# Patient Record
Sex: FEMALE | Race: WHITE | NOT HISPANIC OR LATINO | URBAN - METROPOLITAN AREA
[De-identification: names, ages, dates, MRNs, and addresses within clinical notes are randomized per-mention and may not be internally consistent; named-entity substitution may affect disease eponyms.]

---
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
## 2023-08-01 ENCOUNTER — LABORATORY RESULT (OUTPATIENT)
Age: 18
End: 2023-08-01

## 2023-08-01 ENCOUNTER — APPOINTMENT (OUTPATIENT)
Dept: TRANSGENDER CARE | Facility: CLINIC | Age: 18
End: 2023-08-01
Payer: MEDICAID

## 2023-08-01 VITALS
DIASTOLIC BLOOD PRESSURE: 70 MMHG | SYSTOLIC BLOOD PRESSURE: 100 MMHG | OXYGEN SATURATION: 97 % | HEART RATE: 77 BPM | BODY MASS INDEX: 19.49 KG/M2 | HEIGHT: 65 IN | RESPIRATION RATE: 16 BRPM | WEIGHT: 117 LBS | TEMPERATURE: 97.7 F

## 2023-08-01 PROCEDURE — 99214 OFFICE O/P EST MOD 30 MIN: CPT

## 2023-08-01 NOTE — DATA REVIEWED
[FreeTextEntry1] : Labs 10/2022: Lipid Profile at goal CMP normal Estradiol 102 Testosterone 3.5 A1c 5%  Labs 6/2022: CMP normal Estradiol 102 Testosterone <2.5 LH 0.9 FSH <0.3   Labs 8/2021: Estradiol 56 Testosterone 144 SHBG 40 Lipid Profile at goal CMP normal  Labs 1/2021: Estradiol 6 Testosterone 472 Hb 15.2 HIV nonreactive A1c 4.9%  Chol 132 HDL 46 LDL 63 CMP normal Prolactin 11.6 Vit D 15.6

## 2023-08-01 NOTE — HISTORY OF PRESENT ILLNESS
[FreeTextEntry1] : Sasha KITCHEN is a 17 year old, transfemale here for follow-up for gender affirming hormone therapy  Specific Terms for Body Parts: medical terms are good Call pt: Sasha - AGAB: Male - Gender Identity: FEMALE - Pronouns: SHE/HER - Sexual Orientation: Lesbian - Spoke with: patient - Name Change + Gender Marker: Patient is interested in name change and gender marker resources. Has been avoiding due to lack of time. - Dr. Negrito Gallegos, Psychiatry for eval for readiness for hormone affirming therapy   Seen initially by me 5/2021. Started on estradiol 1mg BID and spironolactone 100mg BID. Now on estradiol 6mg daily and spironolactone 50mg BID with increase in estradiol 8/2021 and again 11/2022 and decreased spironolactone 6/2022 and 11/2022 for suppressed T level. Interested in feminization.  Patient experiences gender dysphoria when he notices another female and her expressions often comparing herself to cisgender females. She also has discomfort when looking at her chest area, bottom area, facial hair, and hears her deep voice. Hates her keysha's apple. Interested in bottom surgery in a few years. Not interested in top surgery unless hormones do not provide enough breast growth. Interested in tracheal shave. Not interested in sperm banking Tucks via a sock and underwear band. No complications. No hx of thromboembolic disease or liver disorders. No tobacco use. +Marijuana use No headaches, changes in vision, vomiting, palpitations, chest pain, LE swelling or calf pain, SOB. +nausea in the morning.  +increased depression and anxiety. No suicidal thoughts or plans.Seeing therapist and psychiatrist    - Transition HX + Present Life: Patient states that she has always wished that she was a woman and thought that this desire was normal. As a child, she played with toys conventionally designed for young girls. Patient learned that this was a taboo subject from her family and did not reveal this desire to anyone as an adolescent, because she was fearful that she would be called a pervert or be perceived as disgusting. She suppressed these desires to present as feminine up until she started high school. She was bullied relentlessly before high school and more after that. For example, her cousin painted her nails and family members teased her over her decision.   Eventually, in her freshman year she met a trans individual, who exposed her to the transgender culture. Patient could easily identify with the general transfemale experience and grew to care less about her family's opinion of her presentation and desires. Patient eventually came out to her mom over the phone. Her reaction was not positive, but the patient accepted it. During a short course of time, the entire family became aware of this. Her aunt (patient's legal guardian), two cousins, and grandma tried to ignore her gender identity, however she insisted that they start acknowledging her identity by using her preferred name and pronouns.   - Mental Health + Medications: Patient has anxiety, depression, and self-harms. Self harm- cut thighs. No history of depression or suicidal ideation. Patient receives counseling from mental health therapist Darlene who practices in [place], [place] (Phone number: [phone]). They have met once a week since late 2019/early 2020, before the COVID-19 quarantine. Patient also started seeing Psychiatrist Dr. Justus Dailey (Phone #: [phone]) after she discontinued seeing Dr. Gallegos after his transfer from [facility]. She sees him once every 2 months.

## 2023-08-01 NOTE — PHYSICAL EXAM
[Alert] : alert [No Acute Distress] : no acute distress [No Proptosis] : no proptosis [Normal Hearing] : hearing was normal [Supple] : the neck was supple [Thyroid Not Enlarged] : the thyroid was not enlarged [No Respiratory Distress] : no respiratory distress [No Accessory Muscle Use] : no accessory muscle use [Normal Rate and Effort] : normal respiratory rate and effort [Clear to Auscultation] : lungs were clear to auscultation bilaterally [Normal S1, S2] : normal S1 and S2 [Normal Rate] : heart rate was normal [Regular Rhythm] : with a regular rhythm [No Edema] : no peripheral edema [No Stigmata of Cushings Syndrome] : no stigmata of Cushings Syndrome [No Clubbing, Cyanosis] : no clubbing  or cyanosis of the fingernails [No Involuntary Movements] : no involuntary movements were seen [Normal Strength/Tone] : muscle strength and tone were normal [Oriented x3] : oriented to person, place, and time [Normal Affect] : the affect was normal [Normal Mood] : the mood was normal [Normal Bowel Sounds] : normal bowel sounds [Not Tender] : non-tender [Not Distended] : not distended [Soft] : abdomen soft [Kyphosis] : no kyphosis present [de-identified] : thin

## 2023-08-01 NOTE — REVIEW OF SYSTEMS
[Nausea] : nausea [All other systems negative] : All other systems negative [Fatigue] : no fatigue [Recent Weight Gain (___ Lbs)] : no recent weight gain [Recent Weight Loss (___ Lbs)] : no recent weight loss [Visual Field Defect] : no visual field defect [Dysphagia] : no dysphagia [Chest Pain] : no chest pain [Palpitations] : no palpitations [Shortness Of Breath] : no shortness of breath [Vomiting] : no vomiting [Polyuria] : no polyuria [Joint Pain] : no joint pain [Acanthosis] : no acanthosis  [Headaches] : no headaches [Tremors] : no tremors [Depression] : no depression [Polydipsia] : no polydipsia [Cold Intolerance] : no cold intolerance [Heat Intolerance] : no heat intolerance [FreeTextEntry7] : +morning nausea [FreeTextEntry9] : +right ankle pain s/p injury

## 2023-08-01 NOTE — ASSESSMENT
[FreeTextEntry1] : 17 y/o transfemale here for feminizing hormone affirming transition. Discussed the feminization process involving estrogen with spironolactone. Discussed physical changes such as breast growth, softer skin, body fat redistribution, less erections etc with timeline ranging from 3-6 months initial onset to maximal benefits in 1-2 years. Pt. seems to prefer more feminine appearance therefore will optimize our goal of testosterone suppression and estrogen levels. Discussed potential risks of transaminitis, CAD and thromboembolic disease especially with tobacco use. Will need to monitor BP, LFTs, and potassium on hormones.No plans for top or bottom surgery at this time. Psych and mental health follow-up recommended. Will need age-appropriate health care and cancer screening maintenance over the years with testicular exam, mammogram or US, colonoscopy, prostate exam. Pt. not interested in sperm banking. Started estradiol 1mg BID and spironolactone 100mg BID with increased doses now on estradiol 6mg daily and spironolactone 100 daily. Check levels on current doses.   Discussion with patient regarding hormone regimen with management plan, risks and benefits, treatment options and goals of care answering all patients questions and addressing all concerns Post-visit completion, consultation, charting and review  Total Time 30 min  Specifically causes, evaluation, treatment options, risks, complications, and benefits of available therapies were discussed. Questions were answered.  The submitted E/M billing level for this visit reflects the total time spent on the day of the visit including face-to-face time spent with the patient, non-face-to-face review of medical records and relevant information, documentation, and asynchronous communication with the patient after a visit via phone, email, or patient's EHR portal after the visit.  The medical records reviewed are either scanned into the chart or reviewed with the patient using a patient's electronic medical records portal for patients with records not available to [facility] via electronic transmission platforms from other institutions and labs.  Time spend counseling and performing coordination of care was also included in determining the appropriate EM billing level.  I have reviewed and verified information regarding the chief complaint and history recorded by the ancillary staff and/or the patient. I have independently reviewed and interpreted tests performed by other physicians and facilities as necessary.   I have discussed with the patient differential diagnosis, reason for auxiliary tests if ordered, risks, benefits, alternatives, and complications of each form of therapy were discussed.

## 2023-10-04 ENCOUNTER — APPOINTMENT (OUTPATIENT)
Dept: PLASTIC SURGERY | Facility: CLINIC | Age: 18
End: 2023-10-04
Payer: MEDICAID

## 2023-10-04 VITALS
WEIGHT: 126 LBS | BODY MASS INDEX: 20.99 KG/M2 | DIASTOLIC BLOOD PRESSURE: 82 MMHG | HEART RATE: 85 BPM | HEIGHT: 65 IN | SYSTOLIC BLOOD PRESSURE: 118 MMHG | TEMPERATURE: 97.7 F

## 2023-10-04 PROCEDURE — 99204 OFFICE O/P NEW MOD 45 MIN: CPT

## 2023-10-05 RX ORDER — QUETIAPINE 400 MG/1
400 TABLET, FILM COATED ORAL
Refills: 0 | Status: ACTIVE | COMMUNITY

## 2023-10-24 RX ORDER — ESTRADIOL 2 MG/1
2 TABLET ORAL
Qty: 270 | Refills: 3 | Status: ACTIVE | COMMUNITY
Start: 2021-05-27 | End: 1900-01-01

## 2023-11-30 RX ORDER — SPIRONOLACTONE 50 MG/1
50 TABLET ORAL
Qty: 180 | Refills: 3 | Status: ACTIVE | COMMUNITY
Start: 2021-05-27 | End: 1900-01-01

## 2024-02-26 ENCOUNTER — APPOINTMENT (OUTPATIENT)
Dept: PLASTIC SURGERY | Facility: CLINIC | Age: 19
End: 2024-02-26
Payer: MEDICAID

## 2024-02-26 VITALS
TEMPERATURE: 98 F | DIASTOLIC BLOOD PRESSURE: 73 MMHG | HEIGHT: 65 IN | SYSTOLIC BLOOD PRESSURE: 108 MMHG | HEART RATE: 75 BPM | BODY MASS INDEX: 19.99 KG/M2 | WEIGHT: 120 LBS

## 2024-02-26 PROCEDURE — 99203 OFFICE O/P NEW LOW 30 MIN: CPT

## 2024-03-03 NOTE — DISCUSSION/SUMMARY
[FreeTextEntry1] : This michelle patient began transitioning in ~2017. She has been on hormonal therapy consistently since 2021 when patient turned 16 years old. She has been in therapy and was diagnosed with Gender Dysphoria concerned about certain facial features that appear masculine and cause bullying desires facial feminization surgery followed by Transgender team. The following facial features appear masculine and will need to be modified: - Luis's apple   Allergies: - Some fruit cause itchy throat    Meds: - Estradiol  - Testosterone - Lexipro - Lamictal - Seroquel  PMHx: - Bipolar Disorder type 2 - ADHD   Surgical History Surgery Type: Elbow fix after a break Year: "chyeanne" Complications?: No  Denies previous botox, fillers or silicone injections.   SH: Admits to marijuana use daily, Denies Cigarette use   ROS: General health / Constitutional: no fever, no chills, no unusual weight changes, no energy level changes, no night sweats Skin: No color or pigmentation changes, no pruritis, no rashes, no ulcers, Hair: No changes in color, texture, distribution, loss Nails: No color changes, brittleness, infection Head: No headaches, no new jaw pain Eyes: Good visual acuity, no color blindness, no corrective lenses, no photophobia, no diplopia, no blurred vision, no infection, pain, no medications, Ear: no tinnitus, no discharge, no pain, no medications Nose: no epistaxis, no rhinorrhea, no rhinitis, no pain, Mouth & Throat: no gingivitis, no gingival bleeding, no ulcers, no voice changes, no changes in oral mucosa or tongue Neck no stiffness, no pain, no lymphadenitis, no thyroid disorders, Respiratory: no cough, no dyspnea, no wheezing, no chest pain, cyanosis, no pneumonia, no asthma, Cardiovascular: no chest pain, no palpitations, no irregular rhythm, no tachycardia, no bradycardia, no heart failure, no dyspnea on exertion (LEVY), no edema Gastrointestinal: no nausea / vomiting, no dysphagia, no reflux / GERD, no abdominal pain, no jaundice Musculoskeletal: no pain in muscles, bones, or joints; no fractures, no dislocations, no muscular weakness, no atrophy Neurological: no paresis, no paralysis, no paresthesia, no seizures, no dizziness, no syncope, no ataxia, no tremor Psychological: no childhood behavioral problems, no irritability, no sleep changes Hematological: no anemia, no bruising, no bleeding tendencies,   PHYSICAL EXAM General: WDWN, in no distress, A & O x 3 (person, place, time) HEENT Head: AT/NC (atraumatic, normocephalic), including TMJ, sensory and motor; + slightly prominent brow and lateral orbital rim type III Eyes: EOMI, PERRLA Ears: exterior, nl hearing, Nose: + slightly wide, slightly bulbous nasal tip with acute nasolabial angle Throat & mouth: gd palate elevation, nl tongue mobility, nl tonsil size; + slightly prominent mandibular angle with active masseter, slightly boxy wide chin Neck: no masses, nl pulses, + prominent tracheal bulge ("Luis's Apple")   We had a 25 minute meeting with the patient discussing diagnosis and medical management issues and outcomes.   First stage FFS: 62110: Tracheal shave, Laryngeothyroidoplasty 41656: Submental fat excision 54791: platysmaplasty         74357 (Submental fat removal): Testosterone exposure will build fibrofatty tissue in the submental region that is not corrected by hormone therapy. This patient has this fibrofatty tissue in the submental region which has created a masculine lateral profile appearance. Direct submental fibrofatty tissue excision is necessary to correct this male feature.         32243 (Platysmaplasty): With prolonged testosterone exposure, the submental region will appear full and ptotic. This patient has a full and ptotic submental and neck region which is associated with a male-appearing neck. The female neck is slender and tight. The platsymaplasty, performed after submental fat excision, will help create a slender and tight, female appearing neck.          81094 (Tracheal shave or tracheolaryngoplasty): A prominent Avila Apple is a feature associated with males. Not all trans-women have a prominent Avila Apple. 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 This is seen on lateral head position and when her head is raised to the paulette. It causes her intense feelings of dysphoria and it is a cause for misgendering. Therefore, the patient would benefit from a tracheal shave procedure which eliminates this prominent Avila Apple associated with male appearance.   No CT scan needed. She will need to provide a letter from her therapist and hormone provider. Will need PCP clearance.   Patient seen in conjunction with Dr. Pete Jensen.

## 2024-03-06 ENCOUNTER — APPOINTMENT (OUTPATIENT)
Dept: TRANSGENDER CARE | Facility: CLINIC | Age: 19
End: 2024-03-06
Payer: MEDICAID

## 2024-03-06 VITALS
HEART RATE: 89 BPM | HEIGHT: 65 IN | WEIGHT: 123 LBS | DIASTOLIC BLOOD PRESSURE: 64 MMHG | SYSTOLIC BLOOD PRESSURE: 120 MMHG | BODY MASS INDEX: 20.49 KG/M2 | TEMPERATURE: 98.1 F | OXYGEN SATURATION: 97 %

## 2024-03-06 DIAGNOSIS — F64.0 TRANSSEXUALISM: ICD-10-CM

## 2024-03-06 DIAGNOSIS — F31.9 BIPOLAR DISORDER, UNSPECIFIED: ICD-10-CM

## 2024-03-06 PROCEDURE — 99214 OFFICE O/P EST MOD 30 MIN: CPT

## 2024-03-06 NOTE — HISTORY OF PRESENT ILLNESS
[FreeTextEntry1] : establish primary care [de-identified] : Patient here today for letter, getting tracheal shave done (first stage FFS) with Dr. Jensen. She has therapist/psychiatrist she is following to get MH letter. She denies any thoughts of hurting self or anyone else, stable on medication. She is following with Dr. Avila for gaht, feeling good on current regimen. She is going to schedule f/u with him, declines any labs or STI testing today.  SH: patient working at [employer] and studying psychology at [employer], enjoys  Pt denies any SOB, cough, chest pain, palpitations, N/V/D/C, fever, or chills. No other health concerns today.

## 2024-03-06 NOTE — HEALTH RISK ASSESSMENT
[HIV test declined] : HIV test declined [Hepatitis C test declined] : Hepatitis C test declined [PHQ-2 Positive] : PHQ-2 Positive [PHQ-9 Positive] : PHQ-9 Positive [I have developed a follow-up plan documented below in the note.] : I have developed a follow-up plan documented below in the note.

## 2024-03-06 NOTE — PLAN
[FreeTextEntry1] : Bipolar Disorder: stable, patient denies any thoughts of hurting self or anyone else at time of visit. Continue care under therapist/psych. Advised patient to call with any questions or concerns. Patient verbalized understanding.   Gender dysphoria: letter written and discussed with patient at time of visit, see letter with today's date on chart. well controlled, continue care under specialist. Advised patient to call with any questions or concerns. Patient verbalized understanding.

## 2024-07-29 ENCOUNTER — APPOINTMENT (OUTPATIENT)
Dept: PLASTIC SURGERY | Facility: CLINIC | Age: 19
End: 2024-07-29

## 2024-07-29 PROCEDURE — 99214 OFFICE O/P EST MOD 30 MIN: CPT

## 2024-08-07 ENCOUNTER — TRANSCRIPTION ENCOUNTER (OUTPATIENT)
Age: 19
End: 2024-08-07

## 2024-08-08 ENCOUNTER — TRANSCRIPTION ENCOUNTER (OUTPATIENT)
Age: 19
End: 2024-08-08

## 2024-08-08 ENCOUNTER — APPOINTMENT (OUTPATIENT)
Dept: PLASTIC SURGERY | Facility: HOSPITAL | Age: 19
End: 2024-08-08

## 2024-08-08 PROCEDURE — 15825 RHYTDCT NCK PLTYSML TGHTG: CPT

## 2024-08-08 PROCEDURE — 15839 EXC EXCESSIVE SKN OTHER AREA: CPT

## 2024-08-08 PROCEDURE — 31899 UNLISTED PX TRACHEA BRONCHI: CPT

## 2024-08-11 NOTE — DISCUSSION/SUMMARY
[FreeTextEntry1] : Patient presented for preoperative consultation prior to facial feminization surgery We reviewed the virtual planning with the patient She desires to have the following facial features modified: -Neck tracheal shave We reviewed these procedures and their risks   FH: noncontributory   SH: no secondary tobacco use,   ROS: General health / Constitutional      no fever, no chills, no unusual weight changes, no energy level changes, no night sweats Skin       No color or pigmentation changes, no pruritis, no rashes, no ulcers, Hair       No changes in color, texture,  distribution, loss Nails       No color changes, brittleness, infection Head       No headaches, no new jaw pain Eyes       Good visual acuity, no color blindness, no corrective lenses, no photophobia, no diplopia, no blurred vision, no infection, pain, no medications, Ear      no tinnitus, no discharge, no pain, no medications Nose      no epistaxis, no rhinorrhea, no rhinitis, no pain, Mouth & Throat      no gingivitis, no gingival bleeding, no ulcers, no voice changes, no changes in oral mucosa or tongue Neck      no stiffness, no pain, no lymphadenitis, no thyroid disorders, Respiratory      no cough, no dyspnea, no wheezing,  no chest pain, cyanosis, no pneumonia, no asthma, Cardiovascular      no chest pain, no palpitations, no irregular rhythm, no tachycardia, no bradycardia, no heart failure, no dyspnea on exertion (LEVY), no edema Gastrointestinal      no nausea / vomiting, no dysphagia, no reflux / GERD, no abdominal pain, no jaundice Musculoskeletal      no pain in muscles, bones, or joints; no fractures, no dislocations, no muscular weakness, no atrophy Neurological      no paresis, no paralysis, no paresthesia, no seizures, no dizziness, no syncope, no ataxia, no tremor Psychological      no childhood behavioral problems, no irritability, no sleep changes Hematological      no anemia, no bruising, no bleeding tendencies,   PHYSICAL EXAM General WDWN, in no distress,  A & O x 3 (person, place, time) HEENT Head: AT/NC (atraumatic, normocephalic), including TMJ, sensory and motor; Prominent brow and lateral orbital rim type III Eyes: EOMI, PERRLA Ears: exterior, nl hearing, Nose: slightly wide, bulbous nasal tip with acute nasiolabial angle intranasal exam showed enlarged turbinates and deviated caudal septum Throat & mouth: gd palate elevation, nl tongue mobility, nl tonsil size Prominent mandibular angle with active masseter Wide chin   Neck: no masses, nl pulses +excess submental fat and prominent laryngeal prominence   We had a 35 minute meeting with the patient discussing diagnosis and medical management issues and outcomes.  FFS: -neck tracheal shave   Spend 35 minutes with patient discussing the diagnosis and treatment plan. Patient informed of the timing and risks moving forward. All patient questions were answered. Patient was given written instructions for care and follow up visit. We discussed the instructions and the patient confirmed an understanding of them.   Bleeding- It is possible, though unusual, to experience a bleeding episode during or after surgery. Should post-operative bleeding occur, it may require emergency treatment to drain accumulated blood or blood transfusion. Intra-operative blood transfusion may also be required. Do not take any aspirin or anti-inflammatory medications for ten days before or after surgery, as this may increase the risk of bleeding. Non-prescription herbs and dietary supplements can increase the risk of surgical bleeding. Hematoma can occur at any time following injury to the breast. If blood transfusions are necessary to treat blood loss, there is the risk of blood-related infections such as hepatitis and HIV (AIDS). Heparin medications that are used to prevent blood clots in veins can produce bleeding and decreased blood platelets.   Infection- Infection is unusual after surgery. Should an infection occur, additional treatment including antibiotics, hospitalization, or additional surgery may be necessary.   Change Skin Sensation- You may experience a diminished (or loss) of sensitivity of the skin of operative area. Partial or permanent loss of skin sensation can occur after surgery. Skin Contour Irregularities- Contour and shape irregularities may occur after surgery. Visible and palpable wrinkling may occur. Residual skin irregularities at the ends of the incisions or dog ears are always a possibility when there is excessive redundant skin. This may improve with time, or it can be surgically corrected.   Sutures- Most surgical techniques use deep sutures. You may notice these sutures after your surgery. Sutures may spontaneously poke through the skin, become visible or produce irritation that requires suture removal.   Skin Discoloration / Swelling- Some bruising and swelling normally occurs following surgery. The skin in or near the surgical site can appear either lighter or darker than surrounding skin. Although uncommon, swelling and skin discoloration may persist for long periods of time and, in rare situations, may be permanent.   Skin Sensitivity- Itching, tenderness, or exaggerated responses to hot or cold temperatures may occur after surgery. Usually this resolves during healing, but in rare situations it may be chronic.   Scarring- All surgery leaves scars, some more visible than others. Although good wound healing after a surgical procedure is expected, abnormal scars may occur within the skin and deeper tissues. Scars may be unattractive and of different color than the surrounding skin tone. Scar appearance may also vary within the same scar. Scars may be asymmetrical (appear different on the right and left side of the body). There is the possibility of visible marks in the skin from sutures. In some cases scars may require surgical revision or treatment.   Damage to Deeper Structures- There is the potential for injury to deeper structures including, nerves, blood vessels, muscles during any surgical procedure. The potential for this to occur varies according to the type of procedure being performed. Injury to deeper structures may be temporary or permanent.   Delayed Healing- Wound disruption or delayed wound healing is possible. Some areas of the skin may not heal normally and may take a long time to heal. Areas of skin tissue may die. This may require frequent dressing changes or further surgery to remove the non-healed tissue. Individuals who have decreased blood supply to tissue from past surgery or radiation therapy may be at increased risk for wound healing and poor surgical outcome. Smokers have a greater risk of skin loss and wound healing complications.   Fat Necrosis- Fatty tissue found deep in the skin might die. This may produce areas of firmness within the skin. Additional surgery to remove areas of fat necrosis may be necessary. There is the possibility of contour irregularities in the skin that may result from fat necrosis.   Allergic Reactions- In rare cases, local allergies to tape, suture material and glues, blood products, topical preparations or injected agents have been reported. Serious systemic reactions including shock (anaphylaxis) may occur in response to drugs used during surgery and prescription medicines. Allergic reactions may require additional treatment.

## 2024-08-19 ENCOUNTER — APPOINTMENT (OUTPATIENT)
Dept: PLASTIC SURGERY | Facility: CLINIC | Age: 19
End: 2024-08-19

## 2024-08-19 DIAGNOSIS — Z09 ENCOUNTER FOR FOLLOW-UP EXAMINATION AFTER COMPLETED TREATMENT FOR CONDITIONS OTHER THAN MALIGNANT NEOPLASM: ICD-10-CM

## 2024-08-19 PROCEDURE — 99024 POSTOP FOLLOW-UP VISIT: CPT

## 2024-08-19 NOTE — PHYSICAL EXAM
[de-identified] : Alert, calm, cooperative. [de-identified] : Tracheal shave incision is well approximated with no signs of wound dehiscence or fluctuance. Moderate edema and mild ecchymosis noted. [de-identified] : Respirations even and unlabored.

## 2024-08-19 NOTE — DISCUSSION/SUMMARY
[FreeTextEntry1] : Sasha is a 19 year old transgender female patient that presents to the office today for a post operative evaluation s/p Submental fat excision, Platysmaplasty, Tracheal shave on 8/8/2024. Patient is healing well. Instructions reviewed and questions/concerns answered.  - Sleep with at least 2 pillows to keep head elevated. - Wear jaw bra 24/7 for at least one more week and then you may switch to wearing jaw bra at night time only.  - Use Ibuprofen for pain relief. - You may shower and air dry or gently pat dry. - Avoid sun exposure to incision sites. - You may advance diet as tolerated.  - Contact us for any questions or concerns. - Follow up on Friday for AI morphing for next FFS stage.   Patient seen in conjunction with Dr. Jensen.

## 2024-08-19 NOTE — REASON FOR VISIT
[Post Op: _________] : a [unfilled] post op visit [FreeTextEntry1] : DATE OF OPERATION: 08/08/2024 PROCEDURES: - Submental Fat Excision - Platysmaplasty - Tracheal Shave

## 2024-08-19 NOTE — HISTORY OF PRESENT ILLNESS
[FreeTextEntry1] : Sasha is a 19 year old transgender female patient that presents to the office today for a post operative evaluation s/p Submental fat excision, Platysmaplasty, Tracheal shave on 8/8/2024. Patient denies having any significant concerns or complaints.

## 2024-08-23 ENCOUNTER — APPOINTMENT (OUTPATIENT)
Dept: PLASTIC SURGERY | Facility: CLINIC | Age: 19
End: 2024-08-23
Payer: MEDICAID

## 2024-08-23 VITALS
DIASTOLIC BLOOD PRESSURE: 68 MMHG | OXYGEN SATURATION: 97 % | HEART RATE: 87 BPM | BODY MASS INDEX: 19.99 KG/M2 | HEIGHT: 65 IN | TEMPERATURE: 98.6 F | SYSTOLIC BLOOD PRESSURE: 111 MMHG | WEIGHT: 120 LBS

## 2024-08-23 DIAGNOSIS — F64.0 TRANSSEXUALISM: ICD-10-CM

## 2024-08-23 PROCEDURE — 99024 POSTOP FOLLOW-UP VISIT: CPT

## 2024-08-25 NOTE — ASSESSMENT
[FreeTextEntry1] : Sasha is a 19 year old transgender female patient that presents to the office today for a post operative evaluation s/p Submental fat excision, Platysmaplasty, Tracheal shave on 8/8/2024. Patient is healing well. Instructions reviewed and questions/concerns answered. AI morphing was performed with the patient to explore her desire for further FFS surgery.  - Sleep with at least 2 pillows to keep head elevated. - Wear jaw bra 24/7 for at least one more week and then you may switch to wearing jaw bra at night time only. - Use Ibuprofen for pain relief. - You may shower and air dry or gently pat dry. - Avoid sun exposure to incision sites. - You may advance diet as tolerated. - Contact us for any questions or concerns.

## 2024-08-25 NOTE — HISTORY OF PRESENT ILLNESS
[FreeTextEntry1] : VALARIE KITCHEN is a 19 year old transgender female patient that presents to the office today for a post operative evaluation s/p Submental fat excision, Platysmaplasty, Tracheal shave on 8/8/2024. Patient denies having any significant concerns or complaints.

## 2024-09-18 ENCOUNTER — APPOINTMENT (OUTPATIENT)
Dept: TRANSGENDER CARE | Facility: CLINIC | Age: 19
End: 2024-09-18
Payer: MEDICAID

## 2024-09-18 VITALS
WEIGHT: 126 LBS | OXYGEN SATURATION: 98 % | DIASTOLIC BLOOD PRESSURE: 68 MMHG | HEART RATE: 74 BPM | HEIGHT: 65 IN | SYSTOLIC BLOOD PRESSURE: 120 MMHG | TEMPERATURE: 98 F | BODY MASS INDEX: 20.99 KG/M2

## 2024-09-18 DIAGNOSIS — Z13.1 ENCOUNTER FOR SCREENING FOR DIABETES MELLITUS: ICD-10-CM

## 2024-09-18 DIAGNOSIS — Z13.220 ENCOUNTER FOR SCREENING FOR LIPOID DISORDERS: ICD-10-CM

## 2024-09-18 DIAGNOSIS — F64.0 TRANSSEXUALISM: ICD-10-CM

## 2024-09-18 PROCEDURE — 99215 OFFICE O/P EST HI 40 MIN: CPT

## 2024-09-18 RX ORDER — PROGESTERONE 100 MG/1
100 CAPSULE ORAL
Qty: 90 | Refills: 1 | Status: ACTIVE | COMMUNITY
Start: 2024-09-18 | End: 1900-01-01

## 2024-09-18 NOTE — HISTORY OF PRESENT ILLNESS
[FreeTextEntry1] : Sasha KITCHEN is a 19 year old, transfemale here for follow-up for gender affirming hormone therapy Pt of Dr. Avila  Specific Terms for Body Parts: medical terms are good Call pt: Sasha - AGAB: Male - Gender Identity: FEMALE - Pronouns: SHE/HER - Sexual Orientation: Lesbian - Spoke with: patient - Name Change + Gender Marker: Patient is interested in name change and gender marker resources. Has been avoiding due to lack of time. - Dr. Negrito Gallegos, Psychiatry for eval for readiness for hormone affirming therapy  Started [facility] in 2021 at age 16. Has been on steady dosing for 1-2 years.  current regimen: PO estradiol 6 mg daily +  spironolactone 50 mg bid interested in progesterone   no breast cancer in family  Seen initially by Dr. Avila 5/2021. Started on estradiol 1mg BID and spironolactone 100mg BID. Now on estradiol 6mg daily and spironolactone 50mg BID with increase in estradiol 8/2021 and again 11/2022 and decreased spironolactone 6/2022 and 11/2022 for suppressed T level. Interested in feminization.  Patient experiences gender dysphoria when he notices another female and her expressions often comparing herself to cisgender females. She also has discomfort when looking at her chest area, bottom area, facial hair, and hears her deep voice.  Interested in bottom surgery in a few years. Not interested in top surgery unless hormones do not provide enough breast growth. Not interested in sperm banking Tucks via a sock and underwear band. No complications. No hx of thromboembolic disease or liver disorders. No tobacco use. +Marijuana use No headaches, changes in vision, vomiting, palpitations, chest pain, LE swelling or calf pain, SOB. +nausea in the morning.  Seeing therapist and psychiatrist   s/p Submental fat excision, Platysmaplasty, Tracheal shave on 8/8/2024 with Dr. Jensen  - Transition HX + Present Life: Patient states that she has always wished that she was a woman and thought that this desire was normal. As a child, she played with toys conventionally designed for young girls. Patient learned that this was a taboo subject from her family and did not reveal this desire to anyone as an adolescent, because she was fearful that she would be called a pervert or be perceived as disgusting. She suppressed these desires to present as feminine up until she started high school. She was bullied relentlessly before high school and more after that. For example, her cousin painted her nails and family members teased her over her decision.  Eventually, in her freshman year she met a trans individual, who exposed her to the transgender culture. Patient could easily identify with the general transfemale experience and grew to care less about her family's opinion of her presentation and desires. Patient eventually came out to her mom over the phone. Her reaction was not positive, but the patient accepted it. During a short course of time, the entire family became aware of this. Her aunt (patient's legal guardian), two cousins, and grandma tried to ignore her gender identity, however she insisted that they start acknowledging her identity by using her preferred name and pronouns.   - Mental Health + Medications: Patient has anxiety, depression, and self-harms. Self harm- cut thighs. No history of depression or suicidal ideation. Patient receives counseling from mental health therapist Darlene, who practices in [place], [place] (Phone number: [phone]). They have met once a week since late 2019/early 2020, before the COVID-19 quarantine. Patient also started seeing Psychiatrist Dr. Justus Dailey (Phone #: [phone]) after she discontinued seeing Dr. Gallegos after his transfer from [facility]. She sees him once every 2 months.  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

## 2024-09-18 NOTE — ASSESSMENT
[FreeTextEntry1] : 18 y/o transfemale here for feminizing hormone affirming transition.  We have previously discussed the feminization process involving estrogen with spironolactone. Discussed physical changes such as breast growth, softer skin, body fat redistribution, less erections etc with timeline ranging from 3-6 months initial onset to maximal benefits in 1-2 years. Pt. seems to prefer more feminine appearance therefore will optimize our goal of testosterone suppression and estrogen levels. We have discussed potential risks of transaminitis, CAD and thromboembolic disease especially with tobacco use. Will need to monitor BP, LFTs, and potassium on hormones. Psych and mental health follow-up recommended. Will need age-appropriate health care and cancer screening maintenance over the years with testicular exam, mammogram or US, colonoscopy, prostate exam. Pt. not interested in sperm banking. Started estradiol 1mg BID and spironolactone 100mg BID with increased doses now on estradiol 6mg daily and spironolactone 100 daily. Check levels on current doses.  - pt is interested in starting progesterone now. Discussed in detail risks and benefits. If no benefit at 6 month armando pt will stop. - discussed that progestins are not typically recommended due to increased CV, VTE, and breast cancer risk based on WHI data in postmenopausal cis-gendered women but there is limited data for transgender populations and there is an unclear risk in this population - still potential risk long term of CAD, breast cancer, VTE; there is anecdotal evidence of improved breast growth/libido. Understands the theoretical risks of progestins and how data is overall limited.   RTC 3-4 months

## 2024-09-18 NOTE — PHYSICAL EXAM
[Alert] : alert [No Acute Distress] : no acute distress [No Proptosis] : no proptosis [Normal Hearing] : hearing was normal [Supple] : the neck was supple [Thyroid Not Enlarged] : the thyroid was not enlarged [No Respiratory Distress] : no respiratory distress [No Accessory Muscle Use] : no accessory muscle use [Normal Rate and Effort] : normal respiratory rate and effort [Clear to Auscultation] : lungs were clear to auscultation bilaterally [Normal S1, S2] : normal S1 and S2 [Normal Rate] : heart rate was normal [Regular Rhythm] : with a regular rhythm [No Edema] : no peripheral edema [Normal Bowel Sounds] : normal bowel sounds [Not Tender] : non-tender [Not Distended] : not distended [Soft] : abdomen soft [No Stigmata of Cushings Syndrome] : no stigmata of Cushings Syndrome [No Clubbing, Cyanosis] : no clubbing  or cyanosis of the fingernails [No Involuntary Movements] : no involuntary movements were seen [Normal Strength/Tone] : muscle strength and tone were normal [Oriented x3] : oriented to person, place, and time [Normal Affect] : the affect was normal [Normal Mood] : the mood was normal [Kyphosis] : no kyphosis present [de-identified] : thin

## 2024-09-19 LAB
25(OH)D3 SERPL-MCNC: 32 NG/ML
ALBUMIN SERPL ELPH-MCNC: 4.6 G/DL
ALP BLD-CCNC: 83 U/L
ALT SERPL-CCNC: 7 U/L
ANION GAP SERPL CALC-SCNC: 13 MMOL/L
AST SERPL-CCNC: 15 U/L
BASOPHILS # BLD AUTO: 0.05 K/UL
BASOPHILS NFR BLD AUTO: 0.7 %
BILIRUB SERPL-MCNC: 0.2 MG/DL
BUN SERPL-MCNC: 8 MG/DL
CALCIUM SERPL-MCNC: 10.1 MG/DL
CHLORIDE SERPL-SCNC: 102 MMOL/L
CHOLEST SERPL-MCNC: 182 MG/DL
CO2 SERPL-SCNC: 24 MMOL/L
CREAT SERPL-MCNC: 0.76 MG/DL
EGFR: 131 ML/MIN/1.73M2
EOSINOPHIL # BLD AUTO: 0.5 K/UL
EOSINOPHIL NFR BLD AUTO: 7 %
ESTIMATED AVERAGE GLUCOSE: 91 MG/DL
ESTRADIOL SERPL-MCNC: 170 PG/ML
GLUCOSE SERPL-MCNC: 69 MG/DL
HBA1C MFR BLD HPLC: 4.8 %
HCT VFR BLD CALC: 42.5 %
HDLC SERPL-MCNC: 54 MG/DL
HGB BLD-MCNC: 14.1 G/DL
IMM GRANULOCYTES NFR BLD AUTO: 0.1 %
LDLC SERPL CALC-MCNC: 96 MG/DL
LYMPHOCYTES # BLD AUTO: 2.82 K/UL
LYMPHOCYTES NFR BLD AUTO: 39.6 %
MAN DIFF?: NORMAL
MCHC RBC-ENTMCNC: 29.7 PG
MCHC RBC-ENTMCNC: 33.2 GM/DL
MCV RBC AUTO: 89.5 FL
MONOCYTES # BLD AUTO: 0.47 K/UL
MONOCYTES NFR BLD AUTO: 6.6 %
NEUTROPHILS # BLD AUTO: 3.27 K/UL
NEUTROPHILS NFR BLD AUTO: 46 %
NONHDLC SERPL-MCNC: 128 MG/DL
PLATELET # BLD AUTO: 304 K/UL
POTASSIUM SERPL-SCNC: 4.4 MMOL/L
PROT SERPL-MCNC: 7.2 G/DL
RBC # BLD: 4.75 M/UL
RBC # FLD: 12.1 %
SODIUM SERPL-SCNC: 139 MMOL/L
TESTOST SERPL-MCNC: 44.2 NG/DL
TRIGL SERPL-MCNC: 192 MG/DL
WBC # FLD AUTO: 7.12 K/UL